# Patient Record
Sex: MALE | Race: WHITE | NOT HISPANIC OR LATINO | Employment: UNEMPLOYED | URBAN - METROPOLITAN AREA
[De-identification: names, ages, dates, MRNs, and addresses within clinical notes are randomized per-mention and may not be internally consistent; named-entity substitution may affect disease eponyms.]

---

## 2022-08-17 ENCOUNTER — OFFICE VISIT (OUTPATIENT)
Dept: ENDOCRINOLOGY | Facility: CLINIC | Age: 57
End: 2022-08-17

## 2022-08-17 VITALS
SYSTOLIC BLOOD PRESSURE: 118 MMHG | OXYGEN SATURATION: 96 % | DIASTOLIC BLOOD PRESSURE: 79 MMHG | WEIGHT: 186 LBS | HEART RATE: 76 BPM | BODY MASS INDEX: 26.63 KG/M2 | HEIGHT: 70 IN

## 2022-08-17 DIAGNOSIS — E11.65 TYPE 2 DIABETES MELLITUS WITH HYPERGLYCEMIA, WITHOUT LONG-TERM CURRENT USE OF INSULIN: Primary | ICD-10-CM

## 2022-08-17 DIAGNOSIS — I50.9 CHRONIC CONGESTIVE HEART FAILURE, UNSPECIFIED HEART FAILURE TYPE: ICD-10-CM

## 2022-08-17 DIAGNOSIS — I10 BENIGN HYPERTENSION: ICD-10-CM

## 2022-08-17 DIAGNOSIS — E78.2 MIXED HYPERLIPIDEMIA: ICD-10-CM

## 2022-08-17 DIAGNOSIS — E03.9 HYPOTHYROIDISM (ACQUIRED): ICD-10-CM

## 2022-08-17 LAB
EXPIRATION DATE: ABNORMAL
GLUCOSE BLDC GLUCOMTR-MCNC: 153 MG/DL (ref 70–130)
Lab: ABNORMAL

## 2022-08-17 PROCEDURE — 82947 ASSAY GLUCOSE BLOOD QUANT: CPT | Performed by: INTERNAL MEDICINE

## 2022-08-17 PROCEDURE — 99204 OFFICE O/P NEW MOD 45 MIN: CPT | Performed by: INTERNAL MEDICINE

## 2022-08-17 RX ORDER — CARVEDILOL 3.12 MG/1
3.12 TABLET ORAL 2 TIMES DAILY WITH MEALS
COMMUNITY
End: 2023-02-23 | Stop reason: HOSPADM

## 2022-08-17 RX ORDER — LEVOTHYROXINE SODIUM 0.03 MG/1
TABLET ORAL
COMMUNITY
Start: 2022-05-30 | End: 2023-01-04 | Stop reason: DRUGHIGH

## 2022-08-17 RX ORDER — DULAGLUTIDE 1.5 MG/.5ML
1.5 INJECTION, SOLUTION SUBCUTANEOUS
Start: 2022-08-17 | End: 2022-08-19 | Stop reason: ALTCHOICE

## 2022-08-17 RX ORDER — ATORVASTATIN CALCIUM 40 MG/1
40 TABLET, FILM COATED ORAL DAILY
COMMUNITY
Start: 2022-04-04

## 2022-08-17 RX ORDER — ASPIRIN 81 MG/1
81 TABLET, CHEWABLE ORAL DAILY
COMMUNITY

## 2022-08-17 RX ORDER — SPIRONOLACTONE 25 MG/1
TABLET ORAL
COMMUNITY
Start: 2022-08-10

## 2022-08-17 RX ORDER — SITAGLIPTIN 100 MG/1
TABLET, FILM COATED ORAL
COMMUNITY
Start: 2022-08-10 | End: 2022-08-17

## 2022-08-17 RX ORDER — SACUBITRIL AND VALSARTAN 24; 26 MG/1; MG/1
TABLET, FILM COATED ORAL
COMMUNITY
Start: 2022-08-09 | End: 2023-02-23 | Stop reason: HOSPADM

## 2022-08-19 ENCOUNTER — DOCUMENTATION (OUTPATIENT)
Dept: ENDOCRINOLOGY | Facility: CLINIC | Age: 57
End: 2022-08-19

## 2022-08-19 RX ORDER — SEMAGLUTIDE 1.34 MG/ML
INJECTION, SOLUTION SUBCUTANEOUS
Qty: 4.5 ML | Refills: 3 | Status: SHIPPED | OUTPATIENT
Start: 2022-08-19 | End: 2023-01-03 | Stop reason: DRUGHIGH

## 2023-01-03 ENCOUNTER — OFFICE VISIT (OUTPATIENT)
Dept: ENDOCRINOLOGY | Facility: CLINIC | Age: 58
End: 2023-01-03
Payer: COMMERCIAL

## 2023-01-03 VITALS
OXYGEN SATURATION: 95 % | HEART RATE: 74 BPM | DIASTOLIC BLOOD PRESSURE: 77 MMHG | WEIGHT: 181 LBS | BODY MASS INDEX: 25.91 KG/M2 | SYSTOLIC BLOOD PRESSURE: 116 MMHG | HEIGHT: 70 IN

## 2023-01-03 DIAGNOSIS — E11.65 TYPE 2 DIABETES MELLITUS WITH HYPERGLYCEMIA, WITHOUT LONG-TERM CURRENT USE OF INSULIN: Primary | ICD-10-CM

## 2023-01-03 DIAGNOSIS — E03.9 HYPOTHYROIDISM (ACQUIRED): ICD-10-CM

## 2023-01-03 DIAGNOSIS — I50.9 CHRONIC CONGESTIVE HEART FAILURE, UNSPECIFIED HEART FAILURE TYPE: ICD-10-CM

## 2023-01-03 DIAGNOSIS — E78.2 MIXED HYPERLIPIDEMIA: ICD-10-CM

## 2023-01-03 DIAGNOSIS — I10 BENIGN HYPERTENSION: ICD-10-CM

## 2023-01-03 LAB
EXPIRATION DATE: ABNORMAL
EXPIRATION DATE: NORMAL
GLUCOSE BLDC GLUCOMTR-MCNC: 338 MG/DL (ref 70–130)
HBA1C MFR BLD: 9.8 %
Lab: ABNORMAL
Lab: NORMAL

## 2023-01-03 PROCEDURE — 99214 OFFICE O/P EST MOD 30 MIN: CPT | Performed by: INTERNAL MEDICINE

## 2023-01-03 PROCEDURE — 83036 HEMOGLOBIN GLYCOSYLATED A1C: CPT | Performed by: INTERNAL MEDICINE

## 2023-01-03 PROCEDURE — 80053 COMPREHEN METABOLIC PANEL: CPT | Performed by: INTERNAL MEDICINE

## 2023-01-03 PROCEDURE — 1159F MED LIST DOCD IN RCRD: CPT | Performed by: INTERNAL MEDICINE

## 2023-01-03 PROCEDURE — 3046F HEMOGLOBIN A1C LEVEL >9.0%: CPT | Performed by: INTERNAL MEDICINE

## 2023-01-03 PROCEDURE — 1160F RVW MEDS BY RX/DR IN RCRD: CPT | Performed by: INTERNAL MEDICINE

## 2023-01-03 PROCEDURE — 82947 ASSAY GLUCOSE BLOOD QUANT: CPT | Performed by: INTERNAL MEDICINE

## 2023-01-03 PROCEDURE — 84443 ASSAY THYROID STIM HORMONE: CPT | Performed by: INTERNAL MEDICINE

## 2023-01-03 RX ORDER — AMITRIPTYLINE HYDROCHLORIDE 25 MG/1
TABLET, FILM COATED ORAL
COMMUNITY
Start: 2023-01-02

## 2023-01-03 RX ORDER — SEMAGLUTIDE 1.34 MG/ML
1 INJECTION, SOLUTION SUBCUTANEOUS
Qty: 9 ML | Refills: 3 | Status: SHIPPED | OUTPATIENT
Start: 2023-01-03 | End: 2023-02-23 | Stop reason: HOSPADM

## 2023-01-04 LAB
ALBUMIN SERPL-MCNC: 4.5 G/DL (ref 3.5–5.2)
ALBUMIN/GLOB SERPL: 1.8 G/DL
ALP SERPL-CCNC: 67 U/L (ref 39–117)
ALT SERPL W P-5'-P-CCNC: 17 U/L (ref 1–41)
ANION GAP SERPL CALCULATED.3IONS-SCNC: 12.8 MMOL/L (ref 5–15)
AST SERPL-CCNC: 14 U/L (ref 1–40)
BILIRUB SERPL-MCNC: 0.5 MG/DL (ref 0–1.2)
BUN SERPL-MCNC: 16 MG/DL (ref 6–20)
BUN/CREAT SERPL: 18.8 (ref 7–25)
CALCIUM SPEC-SCNC: 9 MG/DL (ref 8.6–10.5)
CHLORIDE SERPL-SCNC: 99 MMOL/L (ref 98–107)
CO2 SERPL-SCNC: 24.2 MMOL/L (ref 22–29)
CREAT SERPL-MCNC: 0.85 MG/DL (ref 0.76–1.27)
EGFRCR SERPLBLD CKD-EPI 2021: 101.4 ML/MIN/1.73
GLOBULIN UR ELPH-MCNC: 2.5 GM/DL
GLUCOSE SERPL-MCNC: 330 MG/DL (ref 65–99)
POTASSIUM SERPL-SCNC: 4.1 MMOL/L (ref 3.5–5.2)
PROT SERPL-MCNC: 7 G/DL (ref 6–8.5)
SODIUM SERPL-SCNC: 136 MMOL/L (ref 136–145)
TSH SERPL DL<=0.05 MIU/L-ACNC: 13 UIU/ML (ref 0.27–4.2)

## 2023-01-04 RX ORDER — LEVOTHYROXINE SODIUM 0.05 MG/1
50 TABLET ORAL DAILY
Qty: 90 TABLET | Refills: 3 | Status: SHIPPED | OUTPATIENT
Start: 2023-01-04

## 2023-01-29 ENCOUNTER — TRANSCRIBE ORDERS (OUTPATIENT)
Dept: CARDIOLOGY | Facility: CLINIC | Age: 58
End: 2023-01-29
Payer: COMMERCIAL

## 2023-01-29 DIAGNOSIS — I50.22 CHRONIC SYSTOLIC CONGESTIVE HEART FAILURE: Primary | ICD-10-CM

## 2023-02-23 ENCOUNTER — OFFICE VISIT (OUTPATIENT)
Dept: CARDIOLOGY | Facility: CLINIC | Age: 58
End: 2023-02-23
Payer: COMMERCIAL

## 2023-02-23 VITALS
SYSTOLIC BLOOD PRESSURE: 134 MMHG | OXYGEN SATURATION: 96 % | BODY MASS INDEX: 26.04 KG/M2 | HEIGHT: 71 IN | WEIGHT: 186 LBS | HEART RATE: 73 BPM | DIASTOLIC BLOOD PRESSURE: 76 MMHG

## 2023-02-23 DIAGNOSIS — Z95.810 ICD (IMPLANTABLE CARDIOVERTER-DEFIBRILLATOR), DUAL, IN SITU: ICD-10-CM

## 2023-02-23 DIAGNOSIS — I50.22 CHRONIC SYSTOLIC CONGESTIVE HEART FAILURE: Primary | ICD-10-CM

## 2023-02-23 PROCEDURE — 93282 PRGRMG EVAL IMPLANTABLE DFB: CPT | Performed by: INTERNAL MEDICINE

## 2023-02-23 PROCEDURE — 99214 OFFICE O/P EST MOD 30 MIN: CPT | Performed by: INTERNAL MEDICINE

## 2023-02-23 RX ORDER — SACUBITRIL AND VALSARTAN 97; 103 MG/1; MG/1
TABLET, FILM COATED ORAL
COMMUNITY
Start: 2023-02-07

## 2023-02-23 RX ORDER — EMPAGLIFLOZIN 10 MG/1
TABLET, FILM COATED ORAL
COMMUNITY
Start: 2023-01-27 | End: 2023-02-23

## 2023-02-23 RX ORDER — CARVEDILOL 25 MG/1
TABLET ORAL
COMMUNITY
Start: 2023-02-21

## 2023-08-24 ENCOUNTER — OFFICE VISIT (OUTPATIENT)
Dept: CARDIOLOGY | Facility: CLINIC | Age: 58
End: 2023-08-24
Payer: COMMERCIAL

## 2023-08-24 VITALS
OXYGEN SATURATION: 95 % | WEIGHT: 180 LBS | SYSTOLIC BLOOD PRESSURE: 120 MMHG | DIASTOLIC BLOOD PRESSURE: 66 MMHG | HEIGHT: 71 IN | HEART RATE: 60 BPM | BODY MASS INDEX: 25.2 KG/M2

## 2023-08-24 DIAGNOSIS — E11.8 DM (DIABETES MELLITUS), TYPE 2 WITH COMPLICATIONS: ICD-10-CM

## 2023-08-24 DIAGNOSIS — I50.22 CHRONIC SYSTOLIC CONGESTIVE HEART FAILURE: ICD-10-CM

## 2023-08-24 DIAGNOSIS — Z95.810 ICD (IMPLANTABLE CARDIOVERTER-DEFIBRILLATOR), DUAL, IN SITU: ICD-10-CM

## 2023-08-24 DIAGNOSIS — I50.22 CHRONIC SYSTOLIC CONGESTIVE HEART FAILURE: Primary | ICD-10-CM

## 2023-08-24 PROCEDURE — 99214 OFFICE O/P EST MOD 30 MIN: CPT | Performed by: INTERNAL MEDICINE

## 2023-08-24 PROCEDURE — 3074F SYST BP LT 130 MM HG: CPT | Performed by: INTERNAL MEDICINE

## 2023-08-24 PROCEDURE — 3078F DIAST BP <80 MM HG: CPT | Performed by: INTERNAL MEDICINE

## 2023-08-24 PROCEDURE — 93282 PRGRMG EVAL IMPLANTABLE DFB: CPT | Performed by: INTERNAL MEDICINE

## 2023-08-28 ENCOUNTER — TELEPHONE (OUTPATIENT)
Dept: CARDIOLOGY | Facility: CLINIC | Age: 58
End: 2023-08-28
Payer: COMMERCIAL

## 2023-08-28 DIAGNOSIS — E11.8 DM (DIABETES MELLITUS), TYPE 2 WITH COMPLICATIONS: Primary | ICD-10-CM

## 2023-08-28 DIAGNOSIS — I50.22 CHRONIC SYSTOLIC CONGESTIVE HEART FAILURE: ICD-10-CM

## 2023-08-28 DIAGNOSIS — E11.8 DM (DIABETES MELLITUS), TYPE 2 WITH COMPLICATIONS: ICD-10-CM

## 2023-08-28 RX ORDER — ORAL SEMAGLUTIDE 3 MG/1
3 TABLET ORAL DAILY
Qty: 30 TABLET | Refills: 4 | Status: SHIPPED | OUTPATIENT
Start: 2023-08-28

## 2023-08-28 RX ORDER — ATORVASTATIN CALCIUM 40 MG/1
40 TABLET, FILM COATED ORAL DAILY
Qty: 90 TABLET | Refills: 3 | Status: SHIPPED | OUTPATIENT
Start: 2023-08-28 | End: 2023-08-28 | Stop reason: SDUPTHER

## 2023-08-28 RX ORDER — LEVOTHYROXINE SODIUM 0.05 MG/1
50 TABLET ORAL DAILY
Qty: 90 TABLET | Refills: 3 | Status: SHIPPED | OUTPATIENT
Start: 2023-08-28

## 2023-08-28 RX ORDER — SPIRONOLACTONE 25 MG/1
25 TABLET ORAL DAILY
Qty: 90 TABLET | Refills: 3 | Status: SHIPPED | OUTPATIENT
Start: 2023-08-28

## 2023-08-28 RX ORDER — ATORVASTATIN CALCIUM 80 MG/1
80 TABLET, FILM COATED ORAL DAILY
Qty: 90 TABLET | Refills: 3 | Status: SHIPPED | OUTPATIENT
Start: 2023-08-28

## 2023-08-28 RX ORDER — SACUBITRIL AND VALSARTAN 97; 103 MG/1; MG/1
1 TABLET, FILM COATED ORAL 2 TIMES DAILY
Qty: 180 TABLET | Refills: 3 | Status: SHIPPED | OUTPATIENT
Start: 2023-08-28

## 2023-08-28 RX ORDER — CARVEDILOL 25 MG/1
25 TABLET ORAL 2 TIMES DAILY WITH MEALS
Qty: 180 TABLET | Refills: 3 | Status: SHIPPED | OUTPATIENT
Start: 2023-08-28

## 2023-08-28 RX ORDER — ASPIRIN 81 MG/1
81 TABLET, CHEWABLE ORAL DAILY
Qty: 90 TABLET | Refills: 3 | Status: SHIPPED | OUTPATIENT
Start: 2023-08-28

## 2023-09-27 ENCOUNTER — TELEPHONE (OUTPATIENT)
Dept: CARDIOLOGY | Facility: CLINIC | Age: 58
End: 2023-09-27
Payer: COMMERCIAL

## 2023-09-28 ENCOUNTER — OFFICE VISIT (OUTPATIENT)
Dept: CARDIOLOGY | Facility: CLINIC | Age: 58
End: 2023-09-28
Payer: COMMERCIAL

## 2023-09-28 VITALS
DIASTOLIC BLOOD PRESSURE: 60 MMHG | HEIGHT: 71 IN | OXYGEN SATURATION: 96 % | HEART RATE: 78 BPM | BODY MASS INDEX: 26.32 KG/M2 | WEIGHT: 188 LBS | SYSTOLIC BLOOD PRESSURE: 100 MMHG

## 2023-09-28 DIAGNOSIS — I95.2 HYPOTENSION DUE TO DRUGS: ICD-10-CM

## 2023-09-28 DIAGNOSIS — E11.65 TYPE 2 DIABETES MELLITUS WITH HYPERGLYCEMIA, WITHOUT LONG-TERM CURRENT USE OF INSULIN: ICD-10-CM

## 2023-09-29 PROBLEM — I95.1 AUTONOMIC ORTHOSTATIC HYPOTENSION: Status: ACTIVE | Noted: 2023-09-29

## 2023-09-29 PROBLEM — I95.2 HYPOTENSION DUE TO DRUGS: Status: ACTIVE | Noted: 2023-09-29

## 2023-10-25 DIAGNOSIS — I50.22 CHRONIC SYSTOLIC CONGESTIVE HEART FAILURE: Primary | ICD-10-CM

## 2023-11-30 ENCOUNTER — OFFICE VISIT (OUTPATIENT)
Dept: CARDIOLOGY | Facility: CLINIC | Age: 58
End: 2023-11-30
Payer: COMMERCIAL

## 2023-11-30 VITALS
SYSTOLIC BLOOD PRESSURE: 126 MMHG | HEIGHT: 71 IN | BODY MASS INDEX: 26.88 KG/M2 | OXYGEN SATURATION: 100 % | HEART RATE: 52 BPM | DIASTOLIC BLOOD PRESSURE: 70 MMHG | WEIGHT: 192 LBS

## 2023-11-30 DIAGNOSIS — I50.22 CHRONIC SYSTOLIC CONGESTIVE HEART FAILURE: ICD-10-CM

## 2023-11-30 DIAGNOSIS — E78.00 PURE HYPERCHOLESTEROLEMIA: ICD-10-CM

## 2023-11-30 DIAGNOSIS — Z95.810 ICD (IMPLANTABLE CARDIOVERTER-DEFIBRILLATOR), DUAL, IN SITU: ICD-10-CM

## 2023-11-30 DIAGNOSIS — E11.65 TYPE 2 DIABETES MELLITUS WITH HYPERGLYCEMIA, WITHOUT LONG-TERM CURRENT USE OF INSULIN: ICD-10-CM

## 2023-11-30 DIAGNOSIS — I95.2 HYPOTENSION DUE TO DRUGS: Primary | ICD-10-CM

## 2023-11-30 PROCEDURE — 99214 OFFICE O/P EST MOD 30 MIN: CPT | Performed by: INTERNAL MEDICINE

## 2023-11-30 PROCEDURE — 3078F DIAST BP <80 MM HG: CPT | Performed by: INTERNAL MEDICINE

## 2023-11-30 PROCEDURE — 3074F SYST BP LT 130 MM HG: CPT | Performed by: INTERNAL MEDICINE

## 2023-11-30 RX ORDER — EMPAGLIFLOZIN 25 MG/1
1 TABLET, FILM COATED ORAL DAILY
COMMUNITY
Start: 2023-11-24

## 2023-11-30 RX ORDER — SACUBITRIL AND VALSARTAN 49; 51 MG/1; MG/1
1 TABLET, FILM COATED ORAL 2 TIMES DAILY
Qty: 60 TABLET | Refills: 11 | Status: SHIPPED | OUTPATIENT
Start: 2023-11-30

## 2023-11-30 RX ORDER — ATORVASTATIN CALCIUM 80 MG/1
80 TABLET, FILM COATED ORAL DAILY
COMMUNITY

## 2023-11-30 RX ORDER — ATORVASTATIN CALCIUM 40 MG/1
1 TABLET, FILM COATED ORAL DAILY
COMMUNITY
Start: 2023-11-22 | End: 2023-11-30 | Stop reason: HOSPADM

## 2023-11-30 RX ORDER — POLYETHYLENE GLYCOL 3350 17 G/17G
POWDER, FOR SOLUTION ORAL
COMMUNITY
Start: 2023-11-22

## 2024-02-01 ENCOUNTER — OFFICE VISIT (OUTPATIENT)
Dept: CARDIOLOGY | Facility: CLINIC | Age: 59
End: 2024-02-01
Payer: COMMERCIAL

## 2024-02-01 VITALS
DIASTOLIC BLOOD PRESSURE: 62 MMHG | BODY MASS INDEX: 26.32 KG/M2 | SYSTOLIC BLOOD PRESSURE: 102 MMHG | WEIGHT: 188 LBS | OXYGEN SATURATION: 98 % | HEIGHT: 71 IN | HEART RATE: 68 BPM

## 2024-02-01 DIAGNOSIS — Z95.810 ICD (IMPLANTABLE CARDIOVERTER-DEFIBRILLATOR), DUAL, IN SITU: ICD-10-CM

## 2024-02-01 DIAGNOSIS — E78.00 PURE HYPERCHOLESTEROLEMIA: ICD-10-CM

## 2024-02-01 DIAGNOSIS — I50.22 CHRONIC SYSTOLIC CONGESTIVE HEART FAILURE: Primary | ICD-10-CM

## 2024-02-01 DIAGNOSIS — E11.65 TYPE 2 DIABETES MELLITUS WITH HYPERGLYCEMIA, WITHOUT LONG-TERM CURRENT USE OF INSULIN: ICD-10-CM

## 2024-02-01 PROCEDURE — 3078F DIAST BP <80 MM HG: CPT | Performed by: INTERNAL MEDICINE

## 2024-02-01 PROCEDURE — 99214 OFFICE O/P EST MOD 30 MIN: CPT | Performed by: INTERNAL MEDICINE

## 2024-02-01 PROCEDURE — 3074F SYST BP LT 130 MM HG: CPT | Performed by: INTERNAL MEDICINE

## 2024-02-01 PROCEDURE — 93282 PRGRMG EVAL IMPLANTABLE DFB: CPT | Performed by: INTERNAL MEDICINE

## 2024-02-01 RX ORDER — ATORVASTATIN CALCIUM 40 MG/1
1 TABLET, FILM COATED ORAL DAILY
COMMUNITY
End: 2024-02-01 | Stop reason: SDUPTHER

## 2024-02-01 RX ORDER — ATORVASTATIN CALCIUM 80 MG/1
80 TABLET, FILM COATED ORAL DAILY
Qty: 90 TABLET | Refills: 3 | Status: SHIPPED | OUTPATIENT
Start: 2024-02-01

## 2024-07-25 ENCOUNTER — OFFICE VISIT (OUTPATIENT)
Age: 59
End: 2024-07-25
Payer: COMMERCIAL

## 2024-07-25 VITALS
BODY MASS INDEX: 27.44 KG/M2 | HEIGHT: 71 IN | OXYGEN SATURATION: 94 % | SYSTOLIC BLOOD PRESSURE: 126 MMHG | DIASTOLIC BLOOD PRESSURE: 70 MMHG | WEIGHT: 196 LBS | HEART RATE: 66 BPM

## 2024-07-25 DIAGNOSIS — I50.22 CHRONIC SYSTOLIC CONGESTIVE HEART FAILURE: ICD-10-CM

## 2024-07-25 DIAGNOSIS — Z95.810 ICD (IMPLANTABLE CARDIOVERTER-DEFIBRILLATOR), DUAL, IN SITU: ICD-10-CM

## 2024-07-25 DIAGNOSIS — E78.2 MIXED HYPERLIPIDEMIA: ICD-10-CM

## 2024-07-25 DIAGNOSIS — I25.5 ISCHEMIC CARDIOMYOPATHY: ICD-10-CM

## 2024-07-25 PROBLEM — E11.65 HYPERGLYCEMIA DUE TO TYPE 2 DIABETES MELLITUS: Status: ACTIVE | Noted: 2023-03-27

## 2024-07-25 PROBLEM — E11.9 DIABETES MELLITUS: Status: ACTIVE | Noted: 2022-05-25

## 2024-07-25 RX ORDER — CYCLOBENZAPRINE HCL 10 MG
1 TABLET ORAL EVERY 12 HOURS SCHEDULED
COMMUNITY
Start: 2024-07-12

## 2024-07-25 RX ORDER — SACUBITRIL AND VALSARTAN 97; 103 MG/1; MG/1
1 TABLET, FILM COATED ORAL 2 TIMES DAILY
COMMUNITY
Start: 2024-03-15

## 2024-07-25 RX ORDER — GLIMEPIRIDE 4 MG/1
1 TABLET ORAL DAILY
COMMUNITY
Start: 2024-07-12

## 2024-07-25 RX ORDER — EZETIMIBE 10 MG/1
10 TABLET ORAL DAILY
Qty: 90 TABLET | Refills: 1 | Status: SHIPPED | OUTPATIENT
Start: 2024-07-25

## 2024-08-07 ENCOUNTER — TELEPHONE (OUTPATIENT)
Dept: CARDIOLOGY | Facility: CLINIC | Age: 59
End: 2024-08-07
Payer: COMMERCIAL

## 2024-09-13 RX ORDER — SACUBITRIL AND VALSARTAN 97; 103 MG/1; MG/1
1 TABLET, FILM COATED ORAL 2 TIMES DAILY
Qty: 180 TABLET | Refills: 0 | Status: SHIPPED | OUTPATIENT
Start: 2024-09-13

## 2024-10-17 ENCOUNTER — TELEPHONE (OUTPATIENT)
Dept: CARDIOLOGY | Facility: CLINIC | Age: 59
End: 2024-10-17

## 2024-10-17 NOTE — TELEPHONE ENCOUNTER
I believe he is MRI compatible yes.  But as with all our patients that we will need to be scanned in [place] at [facility] or .  He will also need to have a rep check the defibrillator onsite beforehand and make adjustments so that he does not get shocked.

## 2024-10-17 NOTE — TELEPHONE ENCOUNTER
Caller: Kayleigh Bond    Relationship: Self    Best call back number: [phone]    What is the best time to reach you: ANY    Who are you requesting to speak with (clinical staff, provider,  specific staff member): CLINICAL      What was the call regarding: PATIENT IS GETTING AN MRI, BUT HAS A DEFIBRILLATOR INSTALLED WANTS TO KNOW IF A MRI IS DEFIBRILLATOR SAFE    Is it okay if the provider responds through MyChart: NO PLEASE CALL

## 2024-11-05 ENCOUNTER — TRANSCRIBE ORDERS (OUTPATIENT)
Dept: ADMINISTRATIVE | Facility: HOSPITAL | Age: 59
End: 2024-11-05
Payer: COMMERCIAL

## 2024-11-05 DIAGNOSIS — M54.16 RADICULOPATHY, LUMBAR REGION: Primary | ICD-10-CM

## 2025-01-29 ENCOUNTER — HOSPITAL ENCOUNTER (OUTPATIENT)
Dept: MRI IMAGING | Facility: HOSPITAL | Age: 60
Discharge: HOME | End: 2025-01-29
Admitting: FAMILY MEDICINE
Payer: COMMERCIAL

## 2025-01-29 VITALS
DIASTOLIC BLOOD PRESSURE: 71 MMHG | RESPIRATION RATE: 16 BRPM | HEART RATE: 58 BPM | SYSTOLIC BLOOD PRESSURE: 118 MMHG | OXYGEN SATURATION: 96 %

## 2025-01-29 DIAGNOSIS — M54.16 RADICULOPATHY, LUMBAR REGION: ICD-10-CM

## 2025-01-29 PROCEDURE — 72148 MRI LUMBAR SPINE W/O DYE: CPT

## 2025-01-30 ENCOUNTER — OFFICE VISIT (OUTPATIENT)
Age: 60
End: 2025-01-30
Payer: COMMERCIAL

## 2025-01-30 ENCOUNTER — CLINICAL SUPPORT NO REQUIREMENTS (OUTPATIENT)
Age: 60
End: 2025-01-30
Payer: COMMERCIAL

## 2025-01-30 VITALS
HEART RATE: 60 BPM | OXYGEN SATURATION: 94 % | DIASTOLIC BLOOD PRESSURE: 68 MMHG | HEIGHT: 71 IN | BODY MASS INDEX: 27.34 KG/M2 | SYSTOLIC BLOOD PRESSURE: 132 MMHG

## 2025-01-30 DIAGNOSIS — I25.5 ISCHEMIC CARDIOMYOPATHY: ICD-10-CM

## 2025-01-30 DIAGNOSIS — E78.2 MIXED HYPERLIPIDEMIA: ICD-10-CM

## 2025-01-30 DIAGNOSIS — R00.1 BRADYCARDIA, DRUG INDUCED: ICD-10-CM

## 2025-01-30 DIAGNOSIS — N18.2 CKD (CHRONIC KIDNEY DISEASE) STAGE 2, GFR 60-89 ML/MIN: ICD-10-CM

## 2025-01-30 DIAGNOSIS — Z95.810 ICD (IMPLANTABLE CARDIOVERTER-DEFIBRILLATOR), DUAL, IN SITU: Primary | ICD-10-CM

## 2025-01-30 DIAGNOSIS — T50.905A BRADYCARDIA, DRUG INDUCED: ICD-10-CM

## 2025-01-30 DIAGNOSIS — Z95.810 ICD (IMPLANTABLE CARDIOVERTER-DEFIBRILLATOR), DUAL, IN SITU: ICD-10-CM

## 2025-01-30 DIAGNOSIS — I10 BENIGN HYPERTENSION: ICD-10-CM

## 2025-01-30 RX ORDER — MELOXICAM 15 MG/1
15 TABLET ORAL DAILY
COMMUNITY
Start: 2025-01-15

## 2025-01-30 RX ORDER — CARVEDILOL 12.5 MG/1
12.5 TABLET ORAL 2 TIMES DAILY
Qty: 180 TABLET | Refills: 3 | Status: SHIPPED | OUTPATIENT
Start: 2025-01-30

## 2025-01-30 NOTE — ASSESSMENT & PLAN NOTE
Not taking Zetia; he is unsure why.  Will see if Dr. Candelaria has recent lipids.  May need to repeat and/or re add Zetia as Lipids not at goal.

## 2025-01-30 NOTE — ASSESSMENT & PLAN NOTE
He reports that he has been feeling light-headed and dizzy.  Heart rate 55 today.  When I asked him about his heart rated he notes that at physical therapy he had a heart rate of 48 and they were unable to get it up so they advised ER.  He went home.  No syncope or falls.  No SOA.  Appears Euvolemic. He is on Carvedilol 25mg BID.      Spoke with Dr. LARUEN Carlin and she would like to decrease Carvedilol.  Advised him and his mom to keep an eye on his blood pressure and to let [facility] know about the changes at his appt next month.

## 2025-01-30 NOTE — PROGRESS NOTES
[facility] Provider Note     Date:   2025   Name: Kayleigh Bond  :   1965  PCP: Rakesh Candelaria MD       ..  Chief Complaint   Patient presents with    Dizziness    Chest Pain     Occasional chest pain for a couple months.        History of Present Illness    Kayleigh Bond is a 59 y.o. male who presents today for ICD check.  Good battery life and leads in place.  Battery 5.7 years. No events noted.     Patient is accompanied by his mom today.      He reports that he has been feeling light-headed and dizzy.  Heart rate 55 today.  When I asked him about his heart rated he notes that at physical therapy he had a heart rate of 48 and they were unable to get it up so they advised ER.  He went home.  No syncope or falls.  No SOA.  Appears Euvolemic. He is on Carvedilol 25mg BID.      He is also c/o bloody snot when he blows his nose.    He did not start insulin but is now on Glipizide.  His Ha1c went from 11% down to 7% and now 9%.  We added Zetia last visit for LDL of 176 -he did not get repeat labs with us but has had labs with Dr. Candelaria recently and said he ran out of Zetia and is unsure how long he has been of it.    Ordered repeat echo last visit - he has one scheduled next month with .    He notes he has recently been diagnosed with CKD Stage 2.  Added to problem list to check medications.    Spoke with Dr. LAUREN Carlin and she would like to decrease Carvedilol.  Advised him and his mom to keep an eye on his blood pressure and to let  know about the changes at his appt next month.      ROS:   Reports Denies Present but Stable   Chest Pain [] [x] []   Shortness of Air [] [x] []   Palpitations [] [x] []   Edema [] [x] []   Dizziness [x] [] []       Cardiology and Sleep Related History:    Cardiology history  1.  Nonischemic cardiomyopathy diagnosed   2.  Single-chamber ICD [place]  3.  Diabetes  4.  Medical noncompliance     [employer] single chamber ICD.   HFrEF s/t ICM    7/2020 ASHD CABG w/ Dr. Zamarripa        Problems Addressed this Visit       Benign hypertension     At goal today.  EKG updated.  Decreasing beta blocker for bradycardia so watch for BP elevation.         Relevant Medications    carvedilol (COREG) 12.5 MG tablet    Mixed hyperlipidemia     Not taking Zetia; he is unsure why.  Will see if Dr. Candelaria has recent lipids.  May need to repeat and/or re add Zetia as Lipids not at goal.         ICD (implantable cardioverter-defibrillator), dual, in situ     Good battery life and lead function. No tachyarrhythmias noted.          Ischemic cardiomyopathy     Appears Euvolemic.  On medical therapy.  Repeat echo and follow-up at  next month.         Relevant Medications    carvedilol (COREG) 12.5 MG tablet    CKD (chronic kidney disease) stage 2, GFR 60-89 ml/min     New diagnosis. Added to problem.  Will need to be aware when prescribing medications.         Bradycardia, drug induced     He reports that he has been feeling light-headed and dizzy.  Heart rate 55 today.  When I asked him about his heart rated he notes that at physical therapy he had a heart rate of 48 and they were unable to get it up so they advised ER.  He went home.  No syncope or falls.  No SOA.  Appears Euvolemic. He is on Carvedilol 25mg BID.      Spoke with Dr. LAUREN Carlin and she would like to decrease Carvedilol.  Advised him and his mom to keep an eye on his blood pressure and to let  know about the changes at his appt next month.         Relevant Medications    carvedilol (COREG) 12.5 MG tablet    Other Relevant Orders    ECG 12 Lead     Diagnoses         Codes Comments    CKD (chronic kidney disease) stage 2, GFR 60-89 ml/min     ICD-10-CM: N18.2  ICD-9-CM: 585.2     ICD (implantable cardioverter-defibrillator), dual, in situ     ICD-10-CM: Z95.810  ICD-9-CM: V45.02     Ischemic cardiomyopathy     ICD-10-CM: I25.5  ICD-9-CM: 414.8     Mixed hyperlipidemia     ICD-10-CM:  E78.2  ICD-9-CM: 272.2     Benign hypertension     ICD-10-CM: I10  ICD-9-CM: 401.1     Bradycardia, drug induced     ICD-10-CM: R00.1, T50.905A  ICD-9-CM: 427.89, E947.9              Medications Discontinued During This Encounter   Medication Reason    cyclobenzaprine (FLEXERIL) 10 MG tablet *Therapy completed    ezetimibe (ZETIA) 10 MG tablet *Therapy completed    Jardiance 25 MG tablet tablet *Therapy completed    polyethylene glycol (MIRALAX) 17 GM/SCOOP powder *Therapy completed    carvedilol (COREG) 25 MG tablet Dose adjustment          No Known Allergies      Current Outpatient Medications:     atorvastatin (LIPITOR) 80 MG tablet, Take 1 tablet by mouth Daily., Disp: 90 tablet, Rfl: 3    glimepiride (AMARYL) 4 MG tablet, Take 1 tablet by mouth Daily., Disp: , Rfl:     levothyroxine (SYNTHROID, LEVOTHROID) 50 MCG tablet, Take 1 tablet by mouth Daily., Disp: 90 tablet, Rfl: 3    meloxicam (MOBIC) 15 MG tablet, Take 1 tablet by mouth Daily., Disp: , Rfl:     metFORMIN (GLUCOPHAGE) 850 MG tablet, Take 1 tablet by mouth 2 (Two) Times a Day With Meals., Disp: 180 tablet, Rfl: 3    sacubitril-valsartan (Entresto)  MG tablet, Take 1 tablet by mouth 2 (Two) Times a Day., Disp: 180 tablet, Rfl: 0    spironolactone (ALDACTONE) 25 MG tablet, Take 1 tablet by mouth Daily., Disp: 90 tablet, Rfl: 3    amitriptyline (ELAVIL) 25 MG tablet, prn (Patient not taking: Reported on 1/30/2025), Disp: , Rfl:     aspirin 81 MG chewable tablet, Chew 1 tablet Daily. (Patient not taking: Reported on 1/30/2025), Disp: 90 tablet, Rfl: 3    carvedilol (COREG) 12.5 MG tablet, Take 1 tablet by mouth 2 (Two) Times a Day., Disp: 180 tablet, Rfl: 3    Past Medical History:   Diagnosis Date    CHF (congestive heart failure)     DM (diabetes mellitus)           Patient Active Problem List   Diagnosis    Diabetes mellitus    Benign hypertension    Mixed hyperlipidemia    Heart failure    Hypothyroidism (acquired)    ICD (implantable  "cardioverter-defibrillator), dual, in situ    Hypotension due to drugs    Hyperglycemia due to type 2 diabetes mellitus    Ischemic cardiomyopathy    CKD (chronic kidney disease) stage 2, GFR 60-89 ml/min    Bradycardia, drug induced        Family History   Problem Relation Age of Onset    No Known Problems Mother     No Known Problems Father     Colon cancer Maternal Grandfather          family history includes Colon cancer in his maternal grandfather; No Known Problems in his father and mother.     Social History     Socioeconomic History    Marital status: Single   Tobacco Use    Smoking status: Former     Passive exposure: Past    Smokeless tobacco: Never   Vaping Use    Vaping status: Former    Passive vaping exposure: Yes   Substance and Sexual Activity    Alcohol use: Never    Drug use: Never    Sexual activity: Defer             Vital Signs:  /68 (BP Location: Left arm, Patient Position: Sitting, Cuff Size: Adult)   Pulse 60   Ht 180.3 cm (71\")   SpO2 94%   BMI 27.34 kg/m²   Vitals:    01/30/25 1058   Patient Position: Sitting      Estimated body mass index is 27.34 kg/m² as calculated from the following:    Height as of this encounter: 180.3 cm (71\").    Weight as of 7/25/24: 88.9 kg (196 lb).     Physical Exam  Vitals reviewed.   Constitutional:       Appearance: Normal appearance. He is well-developed.   HENT:      Head: Normocephalic and atraumatic.   Eyes:      General: No scleral icterus.     Pupils: Pupils are equal, round, and reactive to light.   Cardiovascular:      Rate and Rhythm: Normal rate and regular rhythm.      Heart sounds: Normal heart sounds. No murmur heard.     Comments: ICD  Pulmonary:      Breath sounds: Normal breath sounds. No wheezing or rhonchi.   Musculoskeletal:         General: Normal range of motion.      Right lower leg: No edema.      Left lower leg: No edema.   Skin:     General: Skin is warm and dry.      Capillary Refill: Capillary refill takes less than 2 " seconds.      Coloration: Skin is not cyanotic.      Nails: There is no clubbing.   Neurological:      Mental Status: He is alert and oriented to person, place, and time.      Motor: No weakness.      Gait: Gait normal.   Psychiatric:         Mood and Affect: Mood normal.         Behavior: Behavior is cooperative.         Thought Content: Thought content normal.         Cognition and Memory: Memory normal.           ECG 12 Lead    Date/Time: 1/30/2025 11:58 AM  Performed by: Corine Sotelo APRN    Authorized by: Corine Sotelo APRN  Comparison: compared with previous ECG from 10/25/2023  Similar to previous ECG  Rhythm: sinus bradycardia  BPM: 55  Conduction: 1st degree AV block  Other findings: non-specific ST-T wave changes    Clinical impression: abnormal EKG         Assessment and Plan     Diagnoses and all orders for this visit:    1. CKD (chronic kidney disease) stage 2, GFR 60-89 ml/min  Assessment & Plan:  New diagnosis. Added to problem.  Will need to be aware when prescribing medications.      2. ICD (implantable cardioverter-defibrillator), dual, in situ  Assessment & Plan:  Good battery life and lead function. No tachyarrhythmias noted.       3. Ischemic cardiomyopathy  Assessment & Plan:  Appears Euvolemic.  On medical therapy.  Repeat echo and follow-up at  next month.      4. Mixed hyperlipidemia  Assessment & Plan:  Not taking Zetia; he is unsure why.  Will see if Dr. Candelaria has recent lipids.  May need to repeat and/or re add Zetia as Lipids not at goal.      5. Benign hypertension  Assessment & Plan:  At goal today.  EKG updated.  Decreasing beta blocker for bradycardia so watch for BP elevation.      6. Bradycardia, drug induced  Assessment & Plan:  He reports that he has been feeling light-headed and dizzy.  Heart rate 55 today.  When I asked him about his heart rated he notes that at physical therapy he had a heart rate of 48 and they were unable to get it up so they  advised ER.  He went home.  No syncope or falls.  No SOA.  Appears Euvolemic. He is on Carvedilol 25mg BID.      Spoke with Dr. LAUREN Carlin and she would like to decrease Carvedilol.  Advised him and his mom to keep an eye on his blood pressure and to let [facility] know about the changes at his appt next month.    Orders:  -     ECG 12 Lead    Other orders  -     carvedilol (COREG) 12.5 MG tablet; Take 1 tablet by mouth 2 (Two) Times a Day.  Dispense: 180 tablet; Refill: 3          Recommendations: ER if symptoms increase, Report if any new/changing symptoms immediately, and Limit caffeine    Follow Up  Return in about 6 months (around 7/30/2025) for ICD check.    Corine Sotelo, APRCHRISS   01/30/2025     Please note that this explicitly excludes time spent on other separate billable services such as performing procedures or test interpretation, when applicable.  This note was created using dictation software which occasionally transcribes nonsensical phrases. Please contact the provider if any clarification is needed.

## 2025-02-27 RX ORDER — SACUBITRIL AND VALSARTAN 97; 103 MG/1; MG/1
1 TABLET, FILM COATED ORAL 2 TIMES DAILY
Qty: 180 TABLET | Refills: 1 | Status: SHIPPED | OUTPATIENT
Start: 2025-02-27

## 2025-03-13 ENCOUNTER — OFFICE VISIT (OUTPATIENT)
Dept: NEUROSURGERY | Facility: CLINIC | Age: 60
End: 2025-03-13
Payer: COMMERCIAL

## 2025-03-13 ENCOUNTER — HOSPITAL ENCOUNTER (OUTPATIENT)
Dept: GENERAL RADIOLOGY | Facility: HOSPITAL | Age: 60
Discharge: HOME | End: 2025-03-13
Payer: COMMERCIAL

## 2025-03-13 VITALS — HEIGHT: 71 IN | WEIGHT: 200 LBS | TEMPERATURE: 96 F | BODY MASS INDEX: 28 KG/M2

## 2025-03-13 DIAGNOSIS — M54.50 CHRONIC MIDLINE LOW BACK PAIN WITHOUT SCIATICA: ICD-10-CM

## 2025-03-13 DIAGNOSIS — M48.062 SPINAL STENOSIS, LUMBAR REGION, WITH NEUROGENIC CLAUDICATION: ICD-10-CM

## 2025-03-13 DIAGNOSIS — G89.29 CHRONIC MIDLINE LOW BACK PAIN WITHOUT SCIATICA: ICD-10-CM

## 2025-03-13 DIAGNOSIS — M54.50 CHRONIC MIDLINE LOW BACK PAIN WITHOUT SCIATICA: Primary | ICD-10-CM

## 2025-03-13 DIAGNOSIS — G89.29 CHRONIC MIDLINE LOW BACK PAIN WITHOUT SCIATICA: Primary | ICD-10-CM

## 2025-03-13 PROCEDURE — 72120 X-RAY BEND ONLY L-S SPINE: CPT

## 2025-03-13 NOTE — PROGRESS NOTES
Patient: Kayleigh Bond  : 1965    Primary Care Provider: Rakesh Candelaria MD      Chief Complaint: Chronic axial back pain    History of Present Illness:       Mr. Bond is a 59 year-old male who presents to the office today as a new patient due to chronic axial low back pain that has been present for the last 20 years.  Patient reports that his pain has been on and off during this time, but has gotten progressively worse over the last few years.  He tried physical therapy in November for a few months, states that it only minimally helped.  Patient states that his pain is located in the midline of his lower back, denies radiation down either leg. Pain is positional, denies having pain when sitting, but states that his low back hurts when he stands or walks for long periods of time.  He does have neurogenic claudication.  Denies any numbness or weakness.    Past medical history includes type 2 diabetes (last A1c was 9 in January), CHF (follows with Dr. Carlin), and CKD stage II. He is not a smoker.        Review of Systems   Musculoskeletal:  Positive for back pain.   Neurological:  Negative for weakness and numbness.   All other systems reviewed and are negative.      Past Medical History:     Past Medical History:   Diagnosis Date    CHF (congestive heart failure)     DM (diabetes mellitus)        Family History:     Family History   Problem Relation Age of Onset    No Known Problems Mother     No Known Problems Father     Colon cancer Maternal Grandfather        Social History:    reports that he has quit smoking. He has been exposed to tobacco smoke. He has never used smokeless tobacco. He reports that he does not drink alcohol and does not use drugs.   SMOKING STATUS: Non-smoker    Surgical History:     Past Surgical History:   Procedure Laterality Date    APPENDECTOMY      CARDIAC SURGERY      COLONOSCOPY  2023    MANDIBLE FRACTURE SURGERY      PACEMAKER IMPLANTATION    "      Allergies:   Patient has no known allergies.    Physical Exam:    Vital Signs:Temp 96 °F (35.6 °C) (Infrared)   Ht 180.3 cm (71\")   Wt 90.7 kg (200 lb)   BMI 27.89 kg/m²    BMI: Body mass index is 27.89 kg/m².       Patient was resting comfortably in chair during examination  Vital signs were reviewed and documented in the chart  Patient appeared in good neurologic function with normal comprehension fluent speech    Sense of smell deferred    Pupils symmetric equally reactive funduscopic exam not visualized   Visual fields intact to confrontation  Extraocular movements intact  Face motor function is symmetric  Facial sensations normal  Hearing intact to finger rub  Tongue is midline  Palate symmetric  Swallowing normal  Shoulder shrug normal    Decreased vibratory sensation to bilateral feet, more so on left side than right    Muscle bulk and tone normal  5 out of 5 strength to upper and lower extremities, bilaterally  Gait normal intact  Negative Romberg  No clonus or Jenifer's    Reflexes symmetric, but diminished in lower extremities   Patellar reflex, 1+ bilaterally   Achilles reflex, 1+ bilaterally   Biceps reflex, 2+ bilaterally   Brachial radialis reflex, 2+ bilaterally      Medical Decision Making    Data Review:   Lumbar MRI (1/29/25) shows multilevel degenerative disease with disc bulges at L1-2, L2-3, L3-4, and L4-5.  Mild central canal stenosis present at these levels.  Moderate right foraminal stenosis present on L4-5.  Annular tear present at L5-S1.      Diagnosis:   -Chronic axial low back pain  -Neurogenic claudication      Treatment Options:   After examining the patient and reviewing his recent imaging, I think it is best if we tried to manage his pain conservatively before considering surgery. I referred the patient to pain management for evaluation. Lumbar flexion-extension film ordered to ensure the patient does not have spondylolisthesis.     Plan to follow up in about 3 months after " patient has seen pain management.  I advised the patient to call our office if he experiences any new or concerning symptoms, we are always happy to see him back sooner, if needed.       Diagnosis Plan   1. Chronic midline low back pain without sciatica  XR spine lumbar flex and ext    Ambulatory Referral to Pain Management      2. Spinal stenosis, lumbar region, with neurogenic claudication  XR spine lumbar flex and ext    Ambulatory Referral to Pain Management

## 2025-03-26 ENCOUNTER — OFFICE VISIT (OUTPATIENT)
Dept: PAIN MEDICINE | Facility: CLINIC | Age: 60
End: 2025-03-26
Payer: COMMERCIAL

## 2025-03-26 VITALS — BODY MASS INDEX: 28.28 KG/M2 | WEIGHT: 202 LBS | HEIGHT: 71 IN

## 2025-03-26 DIAGNOSIS — M47.817 LUMBOSACRAL SPONDYLOSIS WITHOUT MYELOPATHY: Primary | ICD-10-CM

## 2025-03-26 DIAGNOSIS — G89.4 CHRONIC PAIN SYNDROME: ICD-10-CM

## 2025-03-26 DIAGNOSIS — M54.16 LUMBAR RADICULOPATHY: ICD-10-CM

## 2025-03-26 DIAGNOSIS — M51.46 SCHMORL'S NODES OF LUMBAR REGION: ICD-10-CM

## 2025-03-26 PROCEDURE — 1159F MED LIST DOCD IN RCRD: CPT | Performed by: STUDENT IN AN ORGANIZED HEALTH CARE EDUCATION/TRAINING PROGRAM

## 2025-03-26 PROCEDURE — 99204 OFFICE O/P NEW MOD 45 MIN: CPT | Performed by: STUDENT IN AN ORGANIZED HEALTH CARE EDUCATION/TRAINING PROGRAM

## 2025-03-26 PROCEDURE — 1160F RVW MEDS BY RX/DR IN RCRD: CPT | Performed by: STUDENT IN AN ORGANIZED HEALTH CARE EDUCATION/TRAINING PROGRAM

## 2025-03-26 PROCEDURE — 1125F AMNT PAIN NOTED PAIN PRSNT: CPT | Performed by: STUDENT IN AN ORGANIZED HEALTH CARE EDUCATION/TRAINING PROGRAM

## 2025-06-16 ENCOUNTER — OFFICE VISIT (OUTPATIENT)
Dept: NEUROSURGERY | Facility: CLINIC | Age: 60
End: 2025-06-16
Payer: COMMERCIAL

## 2025-06-16 VITALS
HEART RATE: 70 BPM | WEIGHT: 200.8 LBS | OXYGEN SATURATION: 97 % | HEIGHT: 71 IN | RESPIRATION RATE: 16 BRPM | DIASTOLIC BLOOD PRESSURE: 68 MMHG | BODY MASS INDEX: 28.11 KG/M2 | SYSTOLIC BLOOD PRESSURE: 110 MMHG | TEMPERATURE: 97.2 F

## 2025-06-16 DIAGNOSIS — G89.29 CHRONIC MIDLINE LOW BACK PAIN WITHOUT SCIATICA: Primary | ICD-10-CM

## 2025-06-16 DIAGNOSIS — M54.50 CHRONIC MIDLINE LOW BACK PAIN WITHOUT SCIATICA: Primary | ICD-10-CM

## 2025-06-16 PROCEDURE — 3074F SYST BP LT 130 MM HG: CPT

## 2025-06-16 PROCEDURE — 3078F DIAST BP <80 MM HG: CPT

## 2025-06-16 PROCEDURE — 99213 OFFICE O/P EST LOW 20 MIN: CPT

## 2025-06-16 RX ORDER — INSULIN GLARGINE 100 [IU]/ML
INJECTION, SOLUTION SUBCUTANEOUS DAILY
COMMUNITY

## 2025-06-16 RX ORDER — PEN NEEDLE, DIABETIC 32GX 5/32"
NEEDLE, DISPOSABLE MISCELLANEOUS
COMMUNITY
Start: 2025-04-22

## 2025-06-16 NOTE — PROGRESS NOTES
Patient: Kayleigh Bond  : 1965    Primary Care Provider: Rakesh Candelaria MD      Chief Complaint: Low back pain    History of Present Illness:       Mr. Bond is a 59-year-old male who presents to the office today to follow-up regarding his chronic axial low back pain that has been present for the last 20 years.  Patient states that his pain is located in the midline of his lower back, denies radiation down either leg. Pain is positional, denies having pain when sitting, but states that his low back hurts when he walks 2+ miles. Denies any numbness or weakness.  At his last appointment with me 3 months ago, I referred him to pain management with Dr. Beasley. He started him on meloxicam. Patient states that he is overall doing very well, rates his current pain as a 2 out of 10. States that it will slightly worsen with increased activity, but is overall manageable at this time.    Past medical history includes type 2 diabetes (last A1c was 9 in January), CHF (follows with Dr. Carlin), and CKD stage II. He is not a smoker.       Review of Systems   All other systems reviewed and are negative.      Past Medical History:     Past Medical History:   Diagnosis Date    CHF (congestive heart failure)     DM (diabetes mellitus)        Family History:     Family History   Problem Relation Age of Onset    No Known Problems Mother     No Known Problems Father     Colon cancer Maternal Grandfather        Social History:    reports that he has quit smoking. He has been exposed to tobacco smoke. He has never used smokeless tobacco. He reports that he does not drink alcohol and does not use drugs.   SMOKING STATUS: Non-smoker    Surgical History:     Past Surgical History:   Procedure Laterality Date    APPENDECTOMY      CARDIAC SURGERY      COLONOSCOPY  2023    MANDIBLE FRACTURE SURGERY      PACEMAKER IMPLANTATION         Allergies:   Patient has no known allergies.    Physical Exam:    Vital Signs:/68 (BP  "Location: Right arm, Patient Position: Sitting, Cuff Size: Adult)   Pulse 70   Temp 97.2 °F (36.2 °C) (Infrared)   Resp 16   Ht 180.3 cm (70.98\")   Wt 91.1 kg (200 lb 12.8 oz)   SpO2 97%   BMI 28.02 kg/m²    BMI: Body mass index is 28.02 kg/m².       Patient was resting comfortably in chair during examination  Vital signs were reviewed and documented in the chart  Patient appeared in good neurologic function with normal comprehension fluent speech      Pupils symmetric equally reactive funduscopic exam not visualized   Visual fields intact to confrontation  Extraocular movements intact  Face motor function is symmetric  Facial sensations normal  Hearing intact to finger rub  Tongue is midline  Palate symmetric  Swallowing normal  Shoulder shrug normal      Muscle bulk and tone normal  Triceps 5 out of 5 strength, bilaterally  Biceps 5 out of 5 strength, bilaterally  Hip flexion 5 out of 5 strength, bilaterally  Dorsiflexion 5 out of 5 strength, bilaterally  Plantarflexion 5 out of 5 strength, bilaterally  Gait normal intact  No clonus or Jenifer's     Reflexes symmetric, but diminished in lower extremities              Patellar reflex, 1+ bilaterally              Achilles reflex, 1+ bilaterally              Biceps reflex, 2+ bilaterally              Brachial radialis reflex, 2+ bilaterally      Medical Decision Making    Data Review:   Xray lumbar spine flexion/extension (3/13/25) shows no dynamic listhesis or instability between flexion and extension.      Diagnosis:   -Chronic axial low back pain  -Neurogenic claudication      Treatment Options:   I referred him to physical therapy. Since the patient is doing well, he and I were in agreement that he can follow-up with us on an as-needed basis.     I advised patient to call our office if he is experiencing new or worsening symptoms in the meantime. We are always happy to see him back.         Diagnosis Plan   1. Chronic midline low back pain without " sciatica  Ambulatory Referral to Physical Therapy for Evaluation & Treatment

## 2025-07-31 ENCOUNTER — OFFICE VISIT (OUTPATIENT)
Age: 60
End: 2025-07-31
Payer: COMMERCIAL

## 2025-07-31 VITALS
WEIGHT: 200 LBS | OXYGEN SATURATION: 97 % | DIASTOLIC BLOOD PRESSURE: 70 MMHG | HEART RATE: 82 BPM | BODY MASS INDEX: 28 KG/M2 | HEIGHT: 71 IN | SYSTOLIC BLOOD PRESSURE: 100 MMHG

## 2025-07-31 DIAGNOSIS — E78.00 PURE HYPERCHOLESTEROLEMIA: ICD-10-CM

## 2025-07-31 DIAGNOSIS — I50.22 CHRONIC SYSTOLIC CONGESTIVE HEART FAILURE: Primary | ICD-10-CM

## 2025-07-31 DIAGNOSIS — N18.2 CKD (CHRONIC KIDNEY DISEASE) STAGE 2, GFR 60-89 ML/MIN: ICD-10-CM

## 2025-07-31 DIAGNOSIS — Z95.810 ICD (IMPLANTABLE CARDIOVERTER-DEFIBRILLATOR), DUAL, IN SITU: ICD-10-CM

## 2025-07-31 DIAGNOSIS — E11.65 TYPE 2 DIABETES MELLITUS WITH HYPERGLYCEMIA, WITHOUT LONG-TERM CURRENT USE OF INSULIN: ICD-10-CM

## 2025-07-31 RX ORDER — LEVOTHYROXINE SODIUM 100 UG/1
100 TABLET ORAL DAILY
COMMUNITY

## 2025-07-31 RX ORDER — ATORVASTATIN CALCIUM 80 MG/1
80 TABLET, FILM COATED ORAL DAILY
Qty: 90 TABLET | Refills: 3 | Status: SHIPPED | OUTPATIENT
Start: 2025-07-31

## 2025-07-31 NOTE — PROGRESS NOTES
[facility] Provider Note     Date:   2025   Name: Kayleigh Bond  :   1965  PCP: Rakesh Candelaria MD    Chief Complaint   Patient presents with    Hypertension     Pt states he is here for follow up HTN. No chest pain, SOA, or palpitations. He states he does get dizziness and lightheaded occasionally.     Pacemaker Check       Subjective     History of Present Illness  Kayleigh Bond is a 59 y.o. male who presents today for follow-up on heart failure and ICD  B/P has been doing well.   Relays that has no chest pain or palpitations. No shortness of air. Does relay that has some lightheadedness occasionally. Relays that usually associated with missing a meal.  Will have occasional swelling in feet. Relays that it coincides with eating salty and sugary foods.  No falls.  Has been feeling very well.   ICD check today  Has no new issues or concerns.        2025 Updated      Cardiology history  1.  Nonischemic cardiomyopathy diagnosed   2.  Single-chamber ICD [place] [employer]  3.  Diabetes  4.  Medical noncompliance     [employer] single chamber ICD.  HFrEF s/t ICM    2020 [facility] CABG w/ Dr. Zamarripa        No Known Allergies    Current Outpatient Medications:     amitriptyline (ELAVIL) 25 MG tablet, prn, Disp: , Rfl:     aspirin 81 MG chewable tablet, Chew 1 tablet Daily., Disp: 90 tablet, Rfl: 3    atorvastatin (LIPITOR) 80 MG tablet, Take 1 tablet by mouth Daily., Disp: 90 tablet, Rfl: 3    BD Pen Needle Jacey 2nd Gen 32G X 4 MM misc, USE AS DIRECTED WITH INSULIN EVERY DAY, Disp: , Rfl:     carvedilol (COREG) 12.5 MG tablet, Take 1 tablet by mouth 2 (Two) Times a Day., Disp: 180 tablet, Rfl: 3    empagliflozin (JARDIANCE) 10 MG tablet tablet, Take 1 tablet by mouth Daily., Disp: 90 tablet, Rfl: 3    insulin glargine (LANTUS, SEMGLEE) 100 UNIT/ML injection, Inject  under the skin into the appropriate area as directed Daily. (Patient taking differently: Inject 10 Units under  "the skin into the appropriate area as directed Daily.), Disp: , Rfl:     levothyroxine (SYNTHROID, LEVOTHROID) 100 MCG tablet, Take 1 tablet by mouth Daily., Disp: , Rfl:     meloxicam (MOBIC) 15 MG tablet, Take 1 tablet by mouth Daily., Disp: , Rfl:     metFORMIN (GLUCOPHAGE) 850 MG tablet, Take 1 tablet by mouth 2 (Two) Times a Day With Meals., Disp: 180 tablet, Rfl: 3    sacubitril-valsartan (Entresto)  MG tablet, TAKE ONE TABLET BY MOUTH TWICE DAILY, Disp: 180 tablet, Rfl: 1    spironolactone (ALDACTONE) 25 MG tablet, Take 1 tablet by mouth Daily., Disp: 90 tablet, Rfl: 3    Past Medical History:   Diagnosis Date    CHF (congestive heart failure)     DM (diabetes mellitus)       Past Surgical History:   Procedure Laterality Date    APPENDECTOMY      CARDIAC SURGERY      COLONOSCOPY  11/28/2023    MANDIBLE FRACTURE SURGERY      PACEMAKER IMPLANTATION       Family History   Problem Relation Age of Onset    No Known Problems Mother     No Known Problems Father     Colon cancer Maternal Grandfather      Social History     Socioeconomic History    Marital status: Single   Tobacco Use    Smoking status: Former     Passive exposure: Past    Smokeless tobacco: Never   Vaping Use    Vaping status: Former    Passive vaping exposure: Yes   Substance and Sexual Activity    Alcohol use: Never    Drug use: Never    Sexual activity: Defer       Objective     Vital Signs:  /70 (BP Location: Right arm, Patient Position: Sitting, Cuff Size: Adult)   Pulse 82   Ht 180.3 cm (70.98\")   Wt 90.7 kg (200 lb)   SpO2 97%   BMI 27.91 kg/m²   Estimated body mass index is 27.91 kg/m² as calculated from the following:    Height as of this encounter: 180.3 cm (70.98\").    Weight as of this encounter: 90.7 kg (200 lb).         Physical Exam  Vitals reviewed.   Constitutional:       General: He is not in acute distress.     Appearance: Normal appearance.   HENT:      Head: Normocephalic and atraumatic.      Mouth/Throat:      " Mouth: Mucous membranes are moist.   Eyes:      Conjunctiva/sclera: Conjunctivae normal.   Neck:      Vascular: No carotid bruit.   Cardiovascular:      Rate and Rhythm: Normal rate and regular rhythm.      Pulses: Normal pulses.      Heart sounds: Normal heart sounds. No murmur heard.  Pulmonary:      Effort: Pulmonary effort is normal. No respiratory distress.      Breath sounds: Normal breath sounds. No wheezing or rhonchi.   Abdominal:      General: Abdomen is flat.      Palpations: Abdomen is soft.   Musculoskeletal:      Cervical back: Normal range of motion and neck supple.      Right lower leg: No edema.      Left lower leg: No edema.   Skin:     General: Skin is warm and dry.      Coloration: Skin is not jaundiced.   Neurological:      General: No focal deficit present.      Mental Status: He is alert and oriented to person, place, and time. Mental status is at baseline.      GCS: GCS eye subscore is 4. GCS verbal subscore is 5. GCS motor subscore is 6.      Cranial Nerves: No cranial nerve deficit.      Motor: No weakness.      Gait: Gait normal.   Psychiatric:         Mood and Affect: Mood and affect normal. Mood is not anxious.         Speech: Speech normal.         Behavior: Behavior normal.                     Assessment and Plan     Diagnoses and all orders for this visit:    1. Chronic systolic congestive heart failure (Primary)  -     empagliflozin (JARDIANCE) 10 MG tablet tablet; Take 1 tablet by mouth Daily.  Dispense: 90 tablet; Refill: 3    2. Pure hypercholesterolemia  -     atorvastatin (LIPITOR) 80 MG tablet; Take 1 tablet by mouth Daily.  Dispense: 90 tablet; Refill: 3    3. Type 2 diabetes mellitus with hyperglycemia, without long-term current use of insulin  -     empagliflozin (JARDIANCE) 10 MG tablet tablet; Take 1 tablet by mouth Daily.  Dispense: 90 tablet; Refill: 3    4. CKD (chronic kidney disease) stage 2, GFR 60-89 ml/min    5. ICD (implantable cardioverter-defibrillator), dual,  in situ [Z95.810]        PLAN:  -euvolemic, on medical therapy  -A1c 11.4, up from 9, discussed with patient, was supposed to be on jardiance, but when it did not get refilled he did not take. Will send in 90day supply and multiple refills to help with compliance  -LDL up to 150 from 109, we discovered statin prescription had  2025 and he thought he was still taking it, will resend to his new pharmacy  - ICD good battery life and lead function  -reviewed notes from [facility] team.       Recommendations: Report if any new/changing symptoms immediately      Follow Up  Return in about 4 months (around 2025) for PM/ICD check.    Anna Carlin MD   2025     Please note that this explicitly excludes time spent on other separate billable services such as performing procedures or test interpretation, when applicable.    This note was created using dictation software which occasionally transcribes nonsensical phrases. Please contact the provider if any clarification is needed.